# Patient Record
Sex: FEMALE | Race: OTHER | Employment: STUDENT | URBAN - METROPOLITAN AREA
[De-identification: names, ages, dates, MRNs, and addresses within clinical notes are randomized per-mention and may not be internally consistent; named-entity substitution may affect disease eponyms.]

---
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
## 2020-08-14 ENCOUNTER — OFFICE VISIT (OUTPATIENT)
Dept: CARDIOLOGY CLINIC | Age: 20
End: 2020-08-14
Payer: COMMERCIAL

## 2020-08-14 VITALS
SYSTOLIC BLOOD PRESSURE: 106 MMHG | BODY MASS INDEX: 32.39 KG/M2 | HEIGHT: 62 IN | WEIGHT: 176 LBS | OXYGEN SATURATION: 97 % | RESPIRATION RATE: 16 BRPM | HEART RATE: 101 BPM | DIASTOLIC BLOOD PRESSURE: 70 MMHG

## 2020-08-14 DIAGNOSIS — R00.0 TACHYCARDIA: ICD-10-CM

## 2020-08-14 DIAGNOSIS — R00.2 PALPITATIONS: ICD-10-CM

## 2020-08-14 DIAGNOSIS — R22.2 CHEST WALL MASS: ICD-10-CM

## 2020-08-14 DIAGNOSIS — R00.0 TACHYCARDIA: Primary | ICD-10-CM

## 2020-08-14 PROCEDURE — 99205 OFFICE O/P NEW HI 60 MIN: CPT | Performed by: INTERNAL MEDICINE

## 2020-08-14 PROCEDURE — 93000 ELECTROCARDIOGRAM COMPLETE: CPT | Performed by: INTERNAL MEDICINE

## 2020-08-14 NOTE — PROGRESS NOTES
HISTORY OF PRESENTING Dante Campbelloishady Phelan is a 21 y.o. female who presents with complaints of palpitations and tachycardia. She has experienced tachycardia at times of rest that have been noted on her apple watch. During those times she experiences pounding in her chest and palpitations. [facility] was seen at [facility] for similar complaints along with complaints of chest pain r/t a mass on her chest wall. Unfortunately, she was unable to have CT imaging done due to insurance/financial issues, but would like to re-attempt. EKG today shows sinus rhythm. She has had unremarkable CMP and CBC. She has family hx of hypertension in her parents, no additional family hx of cardiac issues that she is aware of. She denies symptoms upon standing or with exertion. PAST MEDICAL HISTORY     History reviewed. No pertinent past medical history. PAST SURGICAL HISTORY     History reviewed. No pertinent surgical history. ALLERGIES     No Known Allergies       FAMILY HISTORY     Family History   Problem Relation Age of Onset    Hypertension Mother     High Cholesterol Mother     Hypertension Father     High Cholesterol Father     negative for cardiac disease       SOCIAL HISTORY     Social History     Socioeconomic History    Marital status: SINGLE     Spouse name: Not on file    Number of children: Not on file    Years of education: Not on file    Highest education level: Not on file   Tobacco Use    Smoking status: Never Smoker    Smokeless tobacco: Never Used   Substance and Sexual Activity    Alcohol use: Never     Frequency: Never         MEDICATIONS     No current outpatient medications on file. No current facility-administered medications for this visit. I have reviewed the nurses notes, vitals, problem list, allergy list, medical history, family, social history and medications. REVIEW OF SYMPTOMS      General: Pt denies excessive weight gain or loss.  Pt is able to conduct ADL's  HEENT: Denies blurred vision, headaches, hearing loss, epistaxis and difficulty swallowing. Respiratory: Denies cough, congestion, shortness of breath, APONTE, wheezing or stridor. Cardiovascular: +palpitations, Denies precordial pain,  edema or PND  Gastrointestinal: Denies poor appetite, indigestion, abdominal pain or blood in stool  Genitourinary: Denies hematuria, dysuria, increased urinary frequency  Musculoskeletal: Denies joint pain or swelling from muscles or joints  Neurologic: Denies tremor, paresthesias, headache, or sensory motor disturbance  Psychiatric: Denies confusion, insomnia, depression  Integumentray: Denies rash, itching or ulcers. Hematologic: Denies easy bruising, bleeding       PHYSICAL EXAMINATION      Vitals: see vitals section  General: Well developed, in no acute distress. HEENT: No jaundice, oral mucosa moist, no oral ulcers  Neck: Supple, no stiffness, no lymphadenopathy, supple  Heart:  Normal S1/S2 negative S3 or S4. Regular, no murmur, gallop or rub, no jugular venous distention  Respiratory: Clear bilaterally x 4, no wheezing or rales  Abdomen:   Soft, non-tender, bowel sounds are active. Extremities:  No edema, normal cap refill, no cyanosis. Musculoskeletal: +mass, tender to mid/left chest wall  Neuro: A&Ox3, speech clear, gait stable, cooperative, no focal neurologic deficits  Skin: Skin color is normal. No rashes or lesions. Non diaphoretic, moist.  Vascular: 2+ pulses symmetric in all extremities       DIAGNOSTIC DATA      EKG: sinus tachycardia        LABORATORY DATA    No results found for: WBC, HGBPOC, HGB, HGBP, HCTPOC, HCT, PHCT, RBCH, PLT, MCV, HGBEXT, HCTEXT, PLTEXT, HGBEXT, HCTEXT, PLTEXT   No results found for: NA, K, CL, CO2, AGAP, GLU, BUN, CREA, BUCR, GFRAA, GFRNA, CA, TBIL, TBILI, AP, TP, ALB, GLOB, AGRAT, ALT        ASSESSMENT      1. Tachycardia  2. Palpitations  3.  Left chest wall mass       PLAN     Obtain thyroid panel, loop monitor for 30 days, echocardiogram and will re-order CT imaging to evaluate chest mass. ICD-10-CM ICD-9-CM    1. Tachycardia  R00.0 785.0 AMB POC EKG ROUTINE W/ 12 LEADS, INTER & REP      TSH 3RD GENERATION      T3 UPTAKE PROFILE      T4 (THYROXINE)      CTA CHEST W OR W WO CONT      ECHO ADULT COMPLETE   2. Palpitations  R00.2 785.1    3. Chest wall mass  R22.2 786.6      Orders Placed This Encounter    CTA CHEST W OR W WO CONT     Standing Status:   Future     Standing Expiration Date:   9/14/2021     Order Specific Question:   Is Patient Pregnant?      Answer:   No     Order Specific Question:   STAT Creatinine as indicated     Answer:   Yes     Order Specific Question:   Reason for Exam     Answer:   chest mass    TSH, 3RD GENERATION     Standing Status:   Future     Standing Expiration Date:   8/14/2021    T3 UPTAKE PROFILE     Standing Status:   Future     Standing Expiration Date:   8/14/2021    T4     Standing Status:   Future     Standing Expiration Date:   8/14/2021    AMB POC EKG ROUTINE W/ 12 LEADS, INTER & REP     Order Specific Question:   Reason for Exam:     Answer:   tachycardia          FOLLOW-UP     After testing        Ermelinda Blanco MD  Cardiac Electrophysiology / Cardiology    [address]  [address]  [phone] / [phone] Fax   [phone] / [phone] Fax

## 2020-08-14 NOTE — PROGRESS NOTES
ROOM # 2  CP, Heart rate above 100 at random times, HR fast @ night when lying down, Rare SOB with elevated HR  Visit Vitals  /70 (BP 1 Location: Left arm, BP Patient Position: Sitting)   Pulse (!) 101   Resp 16   Ht 5' 2\" (1.575 m)   Wt 176 lb (79.8 kg)   SpO2 97%   BMI 32.19 kg/m²

## 2020-08-15 LAB
FT4I SERPL CALC-MCNC: 3.3 (ref 1.2–4.9)
T3RU NFR SERPL: 32 % (ref 24–39)
T4 SERPL-MCNC: 10.3 UG/DL (ref 4.5–12)
TSH SERPL DL<=0.005 MIU/L-ACNC: <0.005 UIU/ML (ref 0.45–4.5)

## 2020-08-18 ENCOUNTER — TELEPHONE (OUTPATIENT)
Dept: CARDIOLOGY CLINIC | Age: 20
End: 2020-08-18

## 2020-08-18 NOTE — TELEPHONE ENCOUNTER
----- Message from Elissa Wallis NP sent at 8/17/2020 11:20 AM EDT -----  TSH abnormal, please have her see PCP.

## 2020-08-18 NOTE — TELEPHONE ENCOUNTER
Called patient, ID verified using two patient identifiers. Informed patient that per DANELLE Mora NP her TSH lab results are abnormal and that DANELLE Mora NP is advising that patient follow up with her PCP. Lab results faxed to [facility] on [address] per patient request. Patient will contact [facility] to schedule an appointment.

## 2020-09-02 ENCOUNTER — TELEPHONE (OUTPATIENT)
Dept: CARDIOLOGY CLINIC | Age: 20
End: 2020-09-02

## 2020-09-02 DIAGNOSIS — R00.2 PALPITATIONS: ICD-10-CM

## 2020-09-02 DIAGNOSIS — R00.0 TACHYCARDIA: Primary | ICD-10-CM

## 2020-09-02 DIAGNOSIS — R22.2 MASS OF LEFT CHEST WALL: ICD-10-CM

## 2020-09-02 NOTE — TELEPHONE ENCOUNTER
[facility] calling stating that dx: 1. tachycardia 2. palpitations 3. left chest wall mass - all need to be included in order for patient to appropriately receive ordered CT scan. [facility] ask that you please re-send over order with all 3 assessments included by this Friday, 09/04, for that is when patient is scheduled for CT scan at their office.        Fax # [phone]    [facility] # [phone]

## 2020-09-03 NOTE — TELEPHONE ENCOUNTER
[facility] is calling again to check on the status of an authorization for a CT scan scheduled for tomorrow 9/4.     Phone; [phone]  Fax: [phone]

## 2020-09-03 NOTE — TELEPHONE ENCOUNTER
Spoke to [facility] and they do not do Prior Auth for testing. Expect office to do PA. Checked with Maki and she does not do PA outside of [address]. [facility] explained to patient without PA CTA cannot be done at [address] do you have any ideas? Reviewed with Uriah Gilbert and Cabrera Browning and they did not know what steps to take to help.

## 2020-09-04 ENCOUNTER — ANCILLARY PROCEDURE (OUTPATIENT)
Dept: CARDIOLOGY CLINIC | Age: 20
End: 2020-09-04
Payer: COMMERCIAL

## 2020-09-04 ENCOUNTER — TELEPHONE (OUTPATIENT)
Dept: PEDIATRIC ENDOCRINOLOGY | Age: 20
End: 2020-09-04

## 2020-09-04 VITALS
DIASTOLIC BLOOD PRESSURE: 70 MMHG | BODY MASS INDEX: 32.39 KG/M2 | WEIGHT: 176 LBS | HEIGHT: 62 IN | SYSTOLIC BLOOD PRESSURE: 106 MMHG

## 2020-09-04 DIAGNOSIS — R07.9 CHEST PAIN, UNSPECIFIED TYPE: ICD-10-CM

## 2020-09-04 DIAGNOSIS — R00.2 PALPITATIONS: ICD-10-CM

## 2020-09-04 LAB
ECHO AO ASC DIAM: 2.49 CM
ECHO AO ROOT DIAM: 2.36 CM
ECHO AV AREA PEAK VELOCITY: 1.4 CM2
ECHO AV AREA VTI: 1.46 CM2
ECHO AV AREA/BSA PEAK VELOCITY: 0.8 CM2/M2
ECHO AV AREA/BSA VTI: 0.8 CM2/M2
ECHO AV MEAN GRADIENT: 13.88 MMHG
ECHO AV PEAK GRADIENT: 26.19 MMHG
ECHO AV PEAK VELOCITY: 255.87 CM/S
ECHO AV VTI: 50.19 CM
ECHO LA AREA 4C: 15 CM2
ECHO LA MAJOR AXIS: 3.93 CM
ECHO LA MINOR AXIS: 2.17 CM
ECHO LA VOL 2C: 49.95 ML (ref 22–52)
ECHO LA VOL 4C: 39.01 ML (ref 22–52)
ECHO LA VOL BP: 48.22 ML (ref 22–52)
ECHO LA VOL/BSA BIPLANE: 26.63 ML/M2 (ref 16–28)
ECHO LA VOLUME INDEX A2C: 27.59 ML/M2 (ref 16–28)
ECHO LA VOLUME INDEX A4C: 21.55 ML/M2 (ref 16–28)
ECHO LV E' LATERAL VELOCITY: 18.07 CM/S
ECHO LV E' SEPTAL VELOCITY: 15.99 CM/S
ECHO LV EDV A2C: 118.72 ML
ECHO LV EDV A4C: 114.2 ML
ECHO LV EDV BP: 118.28 ML (ref 56–104)
ECHO LV EDV INDEX A4C: 63.1 ML/M2
ECHO LV EDV INDEX BP: 65.3 ML/M2
ECHO LV EDV NDEX A2C: 65.6 ML/M2
ECHO LV EJECTION FRACTION A2C: 63 PERCENT
ECHO LV EJECTION FRACTION A4C: 61 PERCENT
ECHO LV EJECTION FRACTION BIPLANE: 62.2 PERCENT (ref 55–100)
ECHO LV ESV A2C: 44.28 ML
ECHO LV ESV A4C: 44.63 ML
ECHO LV ESV BP: 44.77 ML (ref 19–49)
ECHO LV ESV INDEX A2C: 24.5 ML/M2
ECHO LV ESV INDEX A4C: 24.7 ML/M2
ECHO LV ESV INDEX BP: 24.7 ML/M2
ECHO LV INTERNAL DIMENSION DIASTOLIC: 4.47 CM (ref 3.9–5.3)
ECHO LV INTERNAL DIMENSION SYSTOLIC: 2.86 CM
ECHO LV IVSD: 0.91 CM (ref 0.6–0.9)
ECHO LV MASS 2D: 119.8 G (ref 67–162)
ECHO LV MASS INDEX 2D: 66.2 G/M2 (ref 43–95)
ECHO LV POSTERIOR WALL DIASTOLIC: 0.77 CM (ref 0.6–0.9)
ECHO LVOT DIAM: 2.07 CM
ECHO LVOT PEAK GRADIENT: 4.56 MMHG
ECHO LVOT PEAK VELOCITY: 106.72 CM/S
ECHO LVOT SV: 73.4 ML
ECHO LVOT VTI: 21.92 CM
ECHO MV A VELOCITY: 68.39 CM/S
ECHO MV AREA PHT: 5.5 CM2
ECHO MV E DECELERATION TIME (DT): 0.14 S
ECHO MV E VELOCITY: 118.63 CM/S
ECHO MV E/A RATIO: 1.73
ECHO MV E/E' LATERAL: 6.57
ECHO MV E/E' RATIO (AVERAGED): 6.99
ECHO MV E/E' SEPTAL: 7.42
ECHO MV PRESSURE HALF TIME (PHT): 0.04 S
ECHO RA AREA 4C: 10.1 CM2
ECHO RA MAJOR AXIS: 2.99 CM
ECHO RV INTERNAL DIMENSION: 2.71 CM
ECHO RV TAPSE: 1.84 CM (ref 1.5–2)
ECHO TV REGURGITANT MAX VELOCITY: 253.2 CM/S
ECHO TV REGURGITANT PEAK GRADIENT: 25.64 MMHG
LA VOL DISK BP: 44.63 ML (ref 22–52)

## 2020-09-04 PROCEDURE — 93306 TTE W/DOPPLER COMPLETE: CPT | Performed by: INTERNAL MEDICINE

## 2020-09-04 NOTE — TELEPHONE ENCOUNTER
----- Message from Cidra Cart sent at 9/4/2020 12:38 PM EDT -----  Regarding: Dr Zavala Portal: [phone]  Patient is calling to fax over the  2 labwork order to [address] [place] [address]. Patient is trying to go right now to get it done.  Please advise    Fax: [phone] [facility]  Phone: [phone]

## 2020-09-08 ENCOUNTER — TELEPHONE (OUTPATIENT)
Dept: PEDIATRIC ENDOCRINOLOGY | Age: 20
End: 2020-09-08

## 2020-09-08 ENCOUNTER — DOCUMENTATION ONLY (OUTPATIENT)
Dept: PEDIATRIC ENDOCRINOLOGY | Age: 20
End: 2020-09-08

## 2020-09-08 RX ORDER — METHIMAZOLE 10 MG/1
15 TABLET ORAL DAILY
Qty: 45 TAB | Refills: 4 | Status: SHIPPED | OUTPATIENT
Start: 2020-09-08 | End: 2021-02-17

## 2020-09-08 NOTE — PROGRESS NOTES
Talked to the patient over the phone. Reviewed the thyroid function test results and it indicates she has hyperthyroidism. Started methimazole 10 mg tablet, 1.5 tablets once a day. Side effects of the medications reviewed. Methimazole side effects:    Reviewed stopping the Methimazole immediately and informing us by calling [phone] if the child develops   1. pruritic rash,   2. jaundice,   3. acolic (light color stools) stools or dark urine,   4. joint pain, abdominal pain   5. Fever or pharyngitis (sore throat). Please schedule the patient for follow-up visit:  Type of visit; in person  Follow-up in 4 weeks to be scheduled on October 5 at 1:40 PM.  Patient has been informed.

## 2020-09-08 NOTE — TELEPHONE ENCOUNTER
----- Message from Dang Solis sent at 9/8/2020  9:09 AM EDT -----  Regarding: Dr Gianna Daniel: [phone]  Patient is returning a phone call to the doctor.

## 2020-09-15 ENCOUNTER — OFFICE VISIT (OUTPATIENT)
Dept: CARDIOLOGY CLINIC | Age: 20
End: 2020-09-15
Payer: COMMERCIAL

## 2020-09-15 VITALS
WEIGHT: 179 LBS | HEIGHT: 62 IN | HEART RATE: 108 BPM | SYSTOLIC BLOOD PRESSURE: 110 MMHG | BODY MASS INDEX: 32.94 KG/M2 | OXYGEN SATURATION: 98 % | DIASTOLIC BLOOD PRESSURE: 72 MMHG

## 2020-09-15 DIAGNOSIS — R00.2 PALPITATIONS: Primary | ICD-10-CM

## 2020-09-15 DIAGNOSIS — R00.0 TACHYCARDIA: ICD-10-CM

## 2020-09-15 DIAGNOSIS — E03.9 HYPOTHYROIDISM, UNSPECIFIED TYPE: ICD-10-CM

## 2020-09-15 DIAGNOSIS — R22.2 CHEST WALL MASS: ICD-10-CM

## 2020-09-15 PROCEDURE — 99215 OFFICE O/P EST HI 40 MIN: CPT | Performed by: NURSE PRACTITIONER

## 2020-09-15 NOTE — PROGRESS NOTES
HISTORY OF PRESENTING Dante Campbelloishady Phelan is a 21 y.o. female with complaints of palpitations and tachycardia. She has experienced tachycardia at times of rest that have been noted on her apple watch. During those times she experiences pounding in her chest and palpitations. She was seen at [facility] for similar complaints along with complaints of chest pain r/t a mass on her chest wall. Unfortunately, she was unable to have CT imaging done due to insurance/financial issues, but would like to re-attempt. EKG today shows sinus rhythm. She has had unremarkable CMP and CBC. She has family hx of hypertension in her parents, no additional family hx of cardiac issues that she is aware of. She denies symptoms upon standing or with exertion. She has not been able to complete CTA ordered last visit. Echocardiogram showed normal functioning and 30 day monitor thus far shows sinus rhythm/sinus tachycardia. She had abnormal thyroid which she is in the process of being treated for with Tapazole. PAST MEDICAL HISTORY     History reviewed. No pertinent past medical history. PAST SURGICAL HISTORY     History reviewed. No pertinent surgical history.        ALLERGIES     No Known Allergies       FAMILY HISTORY     Family History   Problem Relation Age of Onset    Hypertension Mother     High Cholesterol Mother     Hypertension Father     High Cholesterol Father     negative for cardiac disease       SOCIAL HISTORY     Social History     Socioeconomic History    Marital status: SINGLE     Spouse name: Not on file    Number of children: Not on file    Years of education: Not on file    Highest education level: Not on file   Tobacco Use    Smoking status: Never Smoker    Smokeless tobacco: Never Used   Substance and Sexual Activity    Alcohol use: Never     Frequency: Never    Drug use: Never         MEDICATIONS     Current Outpatient Medications   Medication Sig    methIMAzole (TAPAZOLE) 10 mg tablet Take 1.5 Tabs by mouth daily. Indications: overactive thyroid gland     No current facility-administered medications for this visit. I have reviewed the nurses notes, vitals, problem list, allergy list, medical history, family, social history and medications. REVIEW OF SYMPTOMS      General: Pt denies excessive weight gain or loss. Pt is able to conduct ADL's  HEENT: Denies blurred vision, headaches, hearing loss, epistaxis and difficulty swallowing. Respiratory: Denies cough, congestion, shortness of breath, APONTE, wheezing or stridor. Cardiovascular: Denies precordial pain, palpitations, edema or PND  Gastrointestinal: Denies poor appetite, indigestion, abdominal pain or blood in stool  Genitourinary: Denies hematuria, dysuria, increased urinary frequency  Musculoskeletal: Denies joint pain or swelling from muscles or joints  Neurologic: Denies tremor, paresthesias, headache, or sensory motor disturbance  Psychiatric: Denies confusion, insomnia, depression  Integumentray: Denies rash, itching or ulcers. Hematologic: Denies easy bruising, bleeding       PHYSICAL EXAMINATION      Vitals: see vitals section  General: Well developed, in no acute distress. HEENT: No jaundice, oral mucosa moist, no oral ulcers  Neck: Supple, no stiffness, no lymphadenopathy, supple  Heart:  Normal S1/S2 negative S3 or S4. Regular, no murmur, gallop or rub, no jugular venous distention  Respiratory: Clear bilaterally x 4, no wheezing or rales  Abdomen:   Soft, non-tender, bowel sounds are active. Extremities:  No edema, normal cap refill, no cyanosis. Musculoskeletal: No clubbing, no deformities  Neuro: A&Ox3, speech clear, gait stable, cooperative, no focal neurologic deficits  Skin: Skin color is normal. No rashes or lesions.  Non diaphoretic, moist.  Vascular: 2+ pulses symmetric in all extremities       DIAGNOSTIC DATA      EKG:        LABORATORY DATA      Lab Results   Component Value Date/Time    WBC 6.1 09/04/2020 01:07 PM    HGB 13.0 09/04/2020 01:07 PM    HCT 40.2 09/04/2020 01:07 PM    PLATELET 181 60/72/7136 01:07 PM    MCV 85 09/04/2020 01:07 PM      Lab Results   Component Value Date/Time    Sodium 141 09/04/2020 01:07 PM    Potassium 4.5 09/04/2020 01:07 PM    Chloride 104 09/04/2020 01:07 PM    CO2 23 09/04/2020 01:07 PM    Glucose 87 09/04/2020 01:07 PM    BUN 8 09/04/2020 01:07 PM    Creatinine 0.65 09/04/2020 01:07 PM    BUN/Creatinine ratio 12 09/04/2020 01:07 PM    GFR est  09/04/2020 01:07 PM    GFR est non- 09/04/2020 01:07 PM    Calcium 10.0 09/04/2020 01:07 PM    Bilirubin, total 0.7 09/04/2020 01:07 PM    Alk. phosphatase 84 09/04/2020 01:07 PM    Protein, total 7.4 09/04/2020 01:07 PM    Albumin 4.5 09/04/2020 01:07 PM    A-G Ratio 1.6 09/04/2020 01:07 PM    ALT (SGPT) 51 (H) 09/04/2020 01:07 PM           ASSESSMENT      1. Tachycardia  2. Palpitations  3. Left chest wall mass       PLAN     Continue treatment for thyroid therapy. Complete 30 day monitor and will evaluate. Suspect that her symptoms are r/t thyroid abnormality and will see PRN for continued symptoms after thyroid has been adjusted. ICD-10-CM ICD-9-CM    1. Palpitations  R00.2 785.1    2. Tachycardia  R00.0 785.0    3. Chest wall mass  R22.2 786.6    4. Hypothyroidism, unspecified type  E03.9 244.9      No orders of the defined types were placed in this encounter. FOLLOW-UP   prn    Thank you, None for allowing me to participate in the care of this extraordinarily pleasant female. Please do not hesitate to contact me for further questions/concerns.        Melody Sushma, NP    [address]  [address] [facility], [facility], [address] [place], [place] [address], [place]  [phone] / [phone] Fax   [phone] / [phone] Fax

## 2020-09-15 NOTE — PROGRESS NOTES
Room 5    Visit Vitals  /72 (BP 1 Location: Left arm, BP Patient Position: Sitting)   Pulse (!) 108   Ht 5' 2\" (1.575 m)   Wt 179 lb (81.2 kg)   SpO2 98%   BMI 32.74 kg/m²         Still feels flutters  Feels like my heart beat drops randomly  But happening less      Chest pain: no  Shortness of breath: no  Edema: no  Palpitations, Skipped beats, Rapid heartbeat: no  Dizziness: no    New diagnosis/Surgeries: no    ER/Hospitalizations: no    Refills: NO

## 2020-09-17 ENCOUNTER — DOCUMENTATION ONLY (OUTPATIENT)
Dept: CARDIOLOGY CLINIC | Age: 20
End: 2020-09-17

## 2020-09-17 NOTE — PROGRESS NOTES
Authorization started with [employer] ([phone]) for CTA of chest with or without contrast (CPT code 35039), to be done at [address] [place] [address] [phone]. Case # [id number]. Clinicals faxed to [phone].

## 2020-10-05 ENCOUNTER — OFFICE VISIT (OUTPATIENT)
Dept: PEDIATRIC ENDOCRINOLOGY | Age: 20
End: 2020-10-05

## 2020-10-05 VITALS
RESPIRATION RATE: 20 BRPM | HEART RATE: 110 BPM | HEIGHT: 64 IN | DIASTOLIC BLOOD PRESSURE: 83 MMHG | BODY MASS INDEX: 30.73 KG/M2 | TEMPERATURE: 97.2 F | OXYGEN SATURATION: 97 % | WEIGHT: 180 LBS | SYSTOLIC BLOOD PRESSURE: 121 MMHG

## 2020-10-05 DIAGNOSIS — E05.90 HYPERTHYROIDISM: Primary | ICD-10-CM

## 2020-10-05 PROCEDURE — 99214 OFFICE O/P EST MOD 30 MIN: CPT | Performed by: PEDIATRICS

## 2020-10-05 NOTE — LETTER
10/5/2020 1:27 PM 
 
Patient:  Enriqueta Boswell YOB: 2000 Date of Visit: 10/5/2020 Dear No Recipients: Thank you for referring Ms. Braden Acosta to me for evaluation/treatment. Below are the relevant portions of my assessment and plan of care. If you have questions, please do not hesitate to call me. I look forward to following MsConsuelo Sarika Echols along with you. Sincerely, Olivia Mock MD

## 2020-10-05 NOTE — PROGRESS NOTES
Cc: 1. Primary hyperthyroidism          2. Goiter         3. Increased heart rate    HOPC:   1. Patient is 21year old with primary hyperthyroidism and is on methimazole 15 mg once a day. Compliance with medication is good. Patient takes the medication in the evening. Patient has good energy, has normal bowel movements. Side effects of medications including skin rash, joint pain, abdominal pain not present. 2. Goiter: no changes, pain in the neck or during swallowing not present  3. Increased heart rate and has less palpitations    ROS: no bone pain, muscle cramps,no abdominal pain, normal bowel movements Good energy, no weakness     Family history: diabetes: yes, thyroid dysfunction: yes. Social history: in [place] [facility], [employer] [facility] [employer]. Visit Vitals  /83 (BP 1 Location: Right arm, BP Patient Position: Sitting)   Pulse (!) 110   Temp 97.2 °F (36.2 °C) (Temporal)   Resp 20   Ht 5' 3.66\" (1.617 m)   Wt 180 lb (81.6 kg)   LMP 07/30/2020   SpO2 97%   BMI 31.23 kg/m²     Neck is supple, no lymphadenopathy, no thyromegaly, no nodules, has acanthosis, pulse: equal and normal Abdomen is nondistended, no pedal edemal     Labs from last visit reviewed:   Component      Latest Ref Rng & Units 9/4/2020 9/4/2020 9/4/2020 9/4/2020           1:07 PM  1:07 PM  1:07 PM  1:07 PM   Alk. phosphatase      39 - 117 IU/L 84      AST      0 - 40 IU/L 32      ALT      0 - 32 IU/L 51 (H)      TSH      0.450 - 4.500 uIU/mL       T4, Free      0.82 - 1.77 ng/dL    1.54   T3, total      71 - 180 ng/dL   217 (H)    Thyroid Stim Immunoglobulin      0.00 - 0.55 IU/L       Thyroid peroxidase Ab      0 - 34 IU/mL         Component      Latest Ref Rng & Units 9/4/2020 9/4/2020 9/4/2020           1:07 PM  1:04 PM  1:04 PM   Alk.  phosphatase      39 - 117 IU/L      AST      0 - 40 IU/L      ALT      0 - 32 IU/L      TSH      0.450 - 4.500 uIU/mL <0.005 (L)     T4, Free      0.82 - 1.77 ng/dL      T3, total      71 - 180 ng/dL      Thyroid Stim Immunoglobulin      0.00 - 0.55 IU/L   1.70 (H)   Thyroid peroxidase Ab      0 - 34 IU/mL  583 (H)        A/P:   1. Primary hyperthyroidism will check Total T3, free T4 and TSH   Continue methimazole at 15 mg, once a day. 2. Goiter: none  3. Increased heart rate and has no palpitations  4. Elevated liver enzyme and acanthosis and insulin resistance  Side effects of methimazole including skin rash, joint pain, liver dysfunction and rare side effects neutropenia reviewed. Patient need to get CBC with diff if they persistent fever of more than 101 *f for 2-3 days to rule out neutropenia. Methimazole side effects:    Reviewed stopping the Methimazole immediately and informing us by calling [phone] if the child develops   1. pruritic rash,   2. jaundice,   3. acolic (light color stools) stools or dark urine,   4. joint pain, abdominal pain   5. Fever or pharyngitis (sore throat). Follow up in 2 months.

## 2020-10-06 LAB
ALT SERPL-CCNC: 35 IU/L (ref 0–32)
AST SERPL-CCNC: 21 IU/L (ref 0–40)
T3 SERPL-MCNC: 127 NG/DL (ref 71–180)
T4 FREE SERPL-MCNC: 1.22 NG/DL (ref 0.82–1.77)
TSH SERPL DL<=0.005 MIU/L-ACNC: <0.005 UIU/ML (ref 0.45–4.5)

## 2020-12-10 ENCOUNTER — OFFICE VISIT (OUTPATIENT)
Dept: PEDIATRIC ENDOCRINOLOGY | Age: 20
End: 2020-12-10
Payer: COMMERCIAL

## 2020-12-10 VITALS
HEART RATE: 95 BPM | WEIGHT: 181.2 LBS | BODY MASS INDEX: 32.11 KG/M2 | OXYGEN SATURATION: 98 % | HEIGHT: 63 IN | TEMPERATURE: 97.6 F | DIASTOLIC BLOOD PRESSURE: 74 MMHG | SYSTOLIC BLOOD PRESSURE: 107 MMHG | RESPIRATION RATE: 20 BRPM

## 2020-12-10 DIAGNOSIS — E05.90 HYPERTHYROIDISM: Primary | ICD-10-CM

## 2020-12-10 PROCEDURE — 99214 OFFICE O/P EST MOD 30 MIN: CPT | Performed by: PEDIATRICS

## 2020-12-10 NOTE — PROGRESS NOTES
Cc: 1. Primary hyperthyroidism          2. Goiter         3. Increased heart rate    HOPC:   1. Patient is 18.5 year old with primary hyperthyroidism and is on methimazole 15 mg once a day. Compliance with medication is good. Patient takes the medication in the morning. Patient has good energy, has normal bowel movements. Side effects of medications including skin rash, joint pain, abdominal pain not present. 2. Goiter: no changes, pain in the neck or during swallowing not present  3. Increased heart rate: resolved    ROS: no bone pain, muscle cramps,no abdominal pain, normal bowel movements Good energy, no weakness     Family history: diabetes: yes, thyroid dysfunction: no. Social history: doing well in college and is in 2 nd year. Visit Vitals  /74 (BP 1 Location: Right arm, BP Patient Position: Sitting)   Pulse 95   Temp 97.6 °F (36.4 °C) (Oral)   Resp 20   Ht 5' 3.39\" (1.61 m)   Wt 181 lb 3.2 oz (82.2 kg)   SpO2 98%   BMI 31.71 kg/m²       Neck is supple, no lymphadenopathy, no thyromegaly,  thyroid gland is smoothe, no nodules, has  dark circles around the neck, pulse equal and normal   Abdomen is nondistended, DTR: normal     Labs from last visit reviewed:   Lab Results   Component Value Date/Time    TSH <0.005 (L) 10/05/2020 01:37 PM    T3 Uptake 32 08/14/2020 04:20 PM    T4, Free 1.22 10/05/2020 01:37 PM    T4, Total 10.3 08/14/2020 04:20 PM     Growth chart: reviewed      A/P:   1. Primary hyperthyroidism will check Total T3, free T4 and TSH   Continue methimazole at 15 mg, once a day. 2.  Increased heart rate; resolved  3. Insulin resistance and acanthosis  Side effects of methimazole including skin rash, joint pain, liver dysfunction and rare side effects neutropenia reviewed. Patient need to get CBC with diff if they persistent fever of more than 101 *f for 2-3 days to rule out neutropenia.     Methimazole side effects:    Reviewed stopping the Methimazole immediately and informing us by calling [phone] if the child develops   1. pruritic rash,   2. jaundice,   3. acolic (light color stools) stools or dark urine,   4. joint pain, abdominal pain   5. Fever or pharyngitis (sore throat). Follow up in 4 months.

## 2020-12-11 DIAGNOSIS — E05.90 HYPERTHYROIDISM: ICD-10-CM

## 2020-12-11 LAB
T3 SERPL-MCNC: 100 NG/DL (ref 71–180)
T4 FREE SERPL-MCNC: 0.85 NG/DL (ref 0.82–1.77)
TSH SERPL DL<=0.005 MIU/L-ACNC: 1.56 UIU/ML (ref 0.45–4.5)

## 2020-12-11 NOTE — PROGRESS NOTES
Thyroid test came back normal.  Continue the current dose of medications and follow-up as scheduled.

## 2021-02-17 RX ORDER — METHIMAZOLE 10 MG/1
TABLET ORAL
Qty: 45 TAB | Refills: 4 | Status: SHIPPED | OUTPATIENT
Start: 2021-02-17 | End: 2021-04-15 | Stop reason: SDUPTHER

## 2021-04-15 ENCOUNTER — OFFICE VISIT (OUTPATIENT)
Dept: PEDIATRIC ENDOCRINOLOGY | Age: 21
End: 2021-04-15
Payer: COMMERCIAL

## 2021-04-15 VITALS
HEIGHT: 63 IN | SYSTOLIC BLOOD PRESSURE: 126 MMHG | BODY MASS INDEX: 33.17 KG/M2 | OXYGEN SATURATION: 99 % | HEART RATE: 81 BPM | RESPIRATION RATE: 16 BRPM | WEIGHT: 187.2 LBS | TEMPERATURE: 97.7 F | DIASTOLIC BLOOD PRESSURE: 84 MMHG

## 2021-04-15 DIAGNOSIS — N91.3 PRIMARY OLIGOMENORRHEA: ICD-10-CM

## 2021-04-15 DIAGNOSIS — E05.90 HYPERTHYROIDISM: Primary | ICD-10-CM

## 2021-04-15 PROCEDURE — 99214 OFFICE O/P EST MOD 30 MIN: CPT | Performed by: PEDIATRICS

## 2021-04-15 RX ORDER — METHIMAZOLE 10 MG/1
TABLET ORAL
Qty: 45 TAB | Refills: 4 | Status: SHIPPED | OUTPATIENT
Start: 2021-04-15 | End: 2021-12-17 | Stop reason: SDUPTHER

## 2021-04-15 NOTE — PROGRESS NOTES
Chief Complaint   Patient presents with    Follow-up     Thyroid      No recent labs  No questions or concerns stated to this nurse

## 2021-04-15 NOTE — PROGRESS NOTES
Cc: 1. Primary hyperthyroidism          2. Goiter         3. Increased weight gain    HOPC:   1. Patient is 21year old with primary hyperthyroidism and is not on methimazole. She ran out of medication 2 months ago and not been on the medicine since then. Patient has good energy, has normal appetite, denied palpitation, jitteriness, no change in her weight, normal bowel movements. 2. Goiter: no changes, pain in the neck or during swallowing not present  3. Increased weight gain; not able to do well with physical activity, she is busy with her college application and studies. She had elevated testosterone before and her every menstrual cycle is between 35 to 40 days. ROS: no bone pain, muscle cramps,no abdominal pain, normal bowel movements Good energy, no weakness. Social history: doing well at college. Visit Vitals  /84 (BP 1 Location: Left upper arm)   Pulse 81   Temp 97.7 °F (36.5 °C) (Temporal)   Resp 16   Ht 5' 2.95\" (1.599 m)   Wt 187 lb 3.2 oz (84.9 kg)   SpO2 99%   BMI 33.21 kg/m²   Neck is supple, no lymphadenopathy, mild thyromegaly, thyroid gland is smooth, no nodules, has dark circles around the neck, pulse equal and normal rhythm, Abdomen is nondistended, DTR: normal     Labs from last visit reviewed:   Lab Results   Component Value Date/Time    TSH 1.560 12/10/2020 11:18 AM     Growth chart: reviewed    A/P:   1. Primary hyperthyroidism will check Total T3, free T4 and TSH    Importance of taking medication was reviewed and association of taking medication and keeping the thyroid levels in euthyroid status and effect on metabolism was discussed. She will hold off on her taking medication until thyroid function test are back  2. Goiter  3. Increased weight gain: Insulin resistance and had elevated testosterone before and we will check hormone levels today to evaluate ovarian function.   Side effects of methimazole including skin rash, joint pain, liver dysfunction and rare side effects neutropenia reviewed. Patient need to get CBC with diff if they persistent fever of more than 101 *f for 2-3 days to rule out neutropenia. Methimazole side effects:    Reviewed stopping the Methimazole immediately and informing us by calling [phone] if the child develops   1. pruritic rash,   2. jaundice,   3. acolic (light color stools) stools or dark urine,   4. joint pain, abdominal pain   5. Fever or pharyngitis (sore throat). Follow up in 3 months.

## 2021-04-16 LAB
COMMENT, TESC2: ABNORMAL
DHEA-S SERPL-MCNC: 557 UG/DL (ref 110–431.7)
FSH SERPL-ACNC: 4.9 MIU/ML
LH SERPL-ACNC: 9.4 MIU/ML
PROLACTIN SERPL-MCNC: 9.5 NG/ML
SHBG SERPL-SCNC: 26.2 NMOL/L (ref 24.6–122)
T3 SERPL-MCNC: 110 NG/DL (ref 71–180)
T4 FREE SERPL-MCNC: 1 NG/DL (ref 0.8–1.5)
TESTOST FREE MFR SERPL: 18.5 NG/DL (ref 0–9.5)
TESTOST SERPL-MCNC: 81 NG/DL (ref 8–48)
TESTOSTERONE.FREE+WB MFR SERPL: 22.8 % (ref 3–18)
TSH SERPL DL<=0.05 MIU/L-ACNC: 0.91 UIU/ML (ref 0.36–3.74)

## 2021-04-22 LAB — 17OHP SERPL-MCNC: 79 NG/DL

## 2021-04-23 ENCOUNTER — DOCUMENTATION ONLY (OUTPATIENT)
Dept: PEDIATRIC ENDOCRINOLOGY | Age: 21
End: 2021-04-23

## 2021-04-23 DIAGNOSIS — N91.3 PRIMARY OLIGOMENORRHEA: Primary | ICD-10-CM

## 2021-05-13 ENCOUNTER — PATIENT MESSAGE (OUTPATIENT)
Dept: PEDIATRIC ENDOCRINOLOGY | Age: 21
End: 2021-05-13

## 2021-05-13 NOTE — TELEPHONE ENCOUNTER
From: Kate Kothari  To: Tank Alicia MD  Sent: 5/13/2021 11:17 AM EDT  Subject: Visit Follow-Up Question    Good morning, I talked with Dr. Gato Deutsch a couple weeks ago about going for a urine test and getting prescription for birth control/hormone medicine but I never received the lab slip or prescription.  I was wondering if the slip was supposed to be mailed or uploaded to the portal?

## 2021-05-23 LAB — HCG UR QL: NEGATIVE

## 2021-05-26 ENCOUNTER — DOCUMENTATION ONLY (OUTPATIENT)
Dept: PEDIATRIC ENDOCRINOLOGY | Age: 21
End: 2021-05-26

## 2021-05-26 NOTE — PROGRESS NOTES
Results was reviewed with the patient. Total testosterone is elevated for the age. DHEA-S is also elevated. Reviewed the ultrasound source and ovarian source for DHEA-S. She had pelvic ultrasound before and had normal ovaries and adrenal gland. She is on her menstrual cycles at present. After her present menstrual cycle she will start low-dose OCP. Univa Side effects of OCP was reviewed. Contraindications to OCP: none and was reviewed  Previous thromboembolic event or stroke  History of estrogen-dependent tumor  Active liver disease  Pregnancy  Hypertriglyceridemia    We will repeat the testosterone, DHEA-S in 3 months. Follow-up pelvic ultrasound will be done. Depending on the repeat DHEA-S appropriate imaging will be arranged and patient expressed understanding. Continue the diet and exercise as discussed.

## 2021-07-16 ENCOUNTER — OFFICE VISIT (OUTPATIENT)
Dept: PEDIATRIC ENDOCRINOLOGY | Age: 21
End: 2021-07-16
Payer: COMMERCIAL

## 2021-07-16 VITALS
BODY MASS INDEX: 33.02 KG/M2 | WEIGHT: 186.38 LBS | OXYGEN SATURATION: 98 % | TEMPERATURE: 97.8 F | DIASTOLIC BLOOD PRESSURE: 90 MMHG | HEIGHT: 63 IN | HEART RATE: 127 BPM | RESPIRATION RATE: 18 BRPM | SYSTOLIC BLOOD PRESSURE: 120 MMHG

## 2021-07-16 DIAGNOSIS — R68.89 ABNORMAL ENDOCRINE LABORATORY TEST FINDING: ICD-10-CM

## 2021-07-16 DIAGNOSIS — E05.90 HYPERTHYROIDISM: Primary | ICD-10-CM

## 2021-07-16 PROCEDURE — 99214 OFFICE O/P EST MOD 30 MIN: CPT | Performed by: PEDIATRICS

## 2021-07-16 NOTE — PATIENT INSTRUCTIONS
Methimazole side effects:    Reviewed stopping the Methimazole immediately and informing us by calling [phone] if the child develops   1. pruritic rash,   2. jaundice,   3. acolic (light color stools) stools or dark urine,   4. joint pain, abdominal pain   5. Fever or pharyngitis (sore throat).

## 2021-07-16 NOTE — PROGRESS NOTES
Cc: 1. Primary hyperthyroidism          2. Increased heart rate         3. Irregular menstrual cycle     HOPC:   1. Patient is 24year old with primary hyperthyroidism and is on methimazole 15 mg once a day. Compliance with medication is good. Patient takes the medication in the morning. Patient has good energy, has normal bowel movements. Side effects of medications including skin rash, joint pain, abdominal pain not present. 2. Goiter: none  3. Irregular menstrual cycle: on OCP and menstrual cycle are regular on OCP. Increased heart rate, and she is feeling anxious. ROS: no bone pain, muscle cramps,no abdominal pain, normal bowel movements Good energy, no weakness. Family history: diabetes: no, thyroid dysfunction: no. Social history: doing well at school. Visit Vitals  BP (!) 120/90 (BP 1 Location: Left arm, BP Patient Position: Sitting)   Pulse (!) 127   Temp 97.8 °F (36.6 °C) (Temporal)   Resp 18   Ht 5' 3.03\" (1.601 m)   Wt 186 lb 6 oz (84.5 kg)   SpO2 98%   BMI 32.98 kg/m²   Neck is supple, no lymphadenopathy, no thyromegaly, thyroid gland is normal, no nodules, no dark circles around the neck, pulse equal and normal rhythm Abdomen is nondistended, DTR: normal     Labs from last visit reviewed:   Lab Results   Component Value Date/Time    TSH 0.91 04/15/2021 09:51 AM     Growth chart: reviewed    A/P:   1. Primary hyperthyroidism will check Total T3, free T4 and TSH   Continue methimazole at 15 mg, once a day. Importance of taking medication was reviewed and association of taking medication and keeping the thyroid levels in euthyroid status and effect on metabolism was discussed. 2. Increased heart rate: anxious   3. Irregular menstrual cycles: menstrual cycles are regular on OCP. Side effects of methimazole including skin rash, joint pain, liver dysfunction and rare side effects neutropenia reviewed.  Patient need to get CBC with diff if they persistent fever of more than 101 *f for 2-3 days to rule out neutropenia. Methimazole side effects:    Reviewed stopping the Methimazole immediately and informing us by calling [phone] if the child develops   1. pruritic rash,   2. jaundice,   3. acolic (light color stools) stools or dark urine,   4. joint pain, abdominal pain   5. Fever or pharyngitis (sore throat). Follow up in 4 months.

## 2021-11-19 ENCOUNTER — OFFICE VISIT (OUTPATIENT)
Dept: PEDIATRIC ENDOCRINOLOGY | Age: 21
End: 2021-11-19
Payer: COMMERCIAL

## 2021-11-19 VITALS
RESPIRATION RATE: 18 BRPM | OXYGEN SATURATION: 98 % | DIASTOLIC BLOOD PRESSURE: 77 MMHG | HEIGHT: 63 IN | SYSTOLIC BLOOD PRESSURE: 114 MMHG | HEART RATE: 84 BPM | BODY MASS INDEX: 32.32 KG/M2 | WEIGHT: 182.4 LBS

## 2021-11-19 DIAGNOSIS — E05.90 HYPERTHYROIDISM: Primary | ICD-10-CM

## 2021-11-19 DIAGNOSIS — E88.81 INSULIN RESISTANCE: ICD-10-CM

## 2021-11-19 PROCEDURE — 99214 OFFICE O/P EST MOD 30 MIN: CPT | Performed by: PEDIATRICS

## 2021-11-19 NOTE — PROGRESS NOTES
Identified pt with two pt identifiers(name and ). Reviewed record in preparation for visit and have obtained necessary documentation. Chief Complaint   Patient presents with    Thyroid Problem        Health Maintenance Due   Topic    Hepatitis C Screening     COVID-19 Vaccine (1)    HPV Age 9Y-34Y (1 - 2-dose series)    DTaP/Tdap/Td series (1 - Tdap)    Pap Smear     Flu Vaccine (1)        Visit Vitals  /77 (BP 1 Location: Left upper arm, BP Patient Position: Sitting)   Pulse 84   Resp 18   Ht 5' 2.99\" (1.6 m)   Wt 182 lb 6.4 oz (82.7 kg)   SpO2 98%   BMI 32.32 kg/m²     Pain Scale: 0 - No pain/10    Coordination of Care Questionnaire:  :   1. Have you been to the ER, urgent care clinic since your last visit? Hospitalized since your last visit? No    2. Have you seen or consulted any other health care providers outside of the [address] [place] [address] since your last visit? Include any pap smears or colon screening.  No

## 2021-11-19 NOTE — PROGRESS NOTES
Cc: 1. Primary hyperthyroidism          2. Increased heart rate         3. Irregular menstrual cycle      HOPC:   1. Patient is 24year old with primary hyperthyroidism and is on methimazole 15 mg once a day. Compliance with medication is good. Patient takes the medication in the afternoon. Patient has good energy, has normal bowel movements. Side effects of medications including skin rash, joint pain, abdominal pain not present. 2. Goiter: none  3. Irregular menstrual cycle: on OCP and menstrual cycle are regular on OCP. Increased heart rate, and she is feeling less anxious. ROS: no bone pain, muscle cramps,no abdominal pain, normal bowel movements Good energy, no weakness. Family history: diabetes: no, thyroid dysfunction: no. Social history: doing well at school. Visit Vitals  /77 (BP 1 Location: Left upper arm, BP Patient Position: Sitting)   Pulse 84   Resp 18   Ht 5' 2.99\" (1.6 m)   Wt 182 lb 6.4 oz (82.7 kg)   SpO2 98%   BMI 32.32 kg/m²     Neck is supple, no lymphadenopathy, no thyromegaly, has dark circles around the neck, pulse equal and normal rhythm, Abdomen is nondistended, DTR: normal     Labs from last visit reviewed:   Lab Results   Component Value Date/Time    TSH 1.10 07/16/2021 11:09 AM     A/P:   1. Primary hyperthyroidism will check Total T3, free T4 and TSH   Continue methimazole at 15 mg, once a day. Importance of taking medication was reviewed and association of taking medication and keeping the thyroid levels in euthyroid status and effect on metabolism was discussed. 2. obesity  3. Insulin resistance and reviewed and A1C was ordered   Side effects of methimazole including skin rash, joint pain, liver dysfunction and rare side effects neutropenia reviewed. Patient need to get CBC with diff if they persistent fever of more than 101 *f for 2-3 days to rule out neutropenia.     Methimazole side effects:    Reviewed stopping the Methimazole immediately and informing us by calling [phone] if the child develops   1. pruritic rash,   2. jaundice,   3. acolic (light color stools) stools or dark urine,   4. joint pain, abdominal pain   5. Fever or pharyngitis (sore throat). Follow up in 4 months.

## 2021-12-20 RX ORDER — METHIMAZOLE 10 MG/1
TABLET ORAL
Qty: 45 TABLET | Refills: 4 | Status: SHIPPED | OUTPATIENT
Start: 2021-12-20 | End: 2022-05-24

## 2022-03-14 ENCOUNTER — OFFICE VISIT (OUTPATIENT)
Dept: PEDIATRIC ENDOCRINOLOGY | Age: 22
End: 2022-03-14
Payer: COMMERCIAL

## 2022-03-14 VITALS
SYSTOLIC BLOOD PRESSURE: 118 MMHG | TEMPERATURE: 98.2 F | WEIGHT: 172.4 LBS | BODY MASS INDEX: 30.55 KG/M2 | DIASTOLIC BLOOD PRESSURE: 85 MMHG | HEIGHT: 63 IN | HEART RATE: 85 BPM

## 2022-03-14 DIAGNOSIS — E05.90 HYPERTHYROIDISM: Primary | ICD-10-CM

## 2022-03-14 LAB
ALBUMIN SERPL-MCNC: 3.7 G/DL (ref 3.5–5)
ALBUMIN/GLOB SERPL: 1.1 {RATIO} (ref 1.1–2.2)
ALP SERPL-CCNC: 55 U/L (ref 45–117)
ALT SERPL-CCNC: 22 U/L (ref 12–78)
ANION GAP SERPL CALC-SCNC: 6 MMOL/L (ref 5–15)
AST SERPL-CCNC: 11 U/L (ref 15–37)
BASOPHILS # BLD: 0.1 K/UL (ref 0–0.1)
BASOPHILS NFR BLD: 1 % (ref 0–1)
BILIRUB SERPL-MCNC: 0.4 MG/DL (ref 0.2–1)
BUN SERPL-MCNC: 13 MG/DL (ref 6–20)
BUN/CREAT SERPL: 14 (ref 12–20)
CALCIUM SERPL-MCNC: 9.1 MG/DL (ref 8.5–10.1)
CHLORIDE SERPL-SCNC: 107 MMOL/L (ref 97–108)
CO2 SERPL-SCNC: 25 MMOL/L (ref 21–32)
CREAT SERPL-MCNC: 0.92 MG/DL (ref 0.55–1.02)
DIFFERENTIAL METHOD BLD: NORMAL
EOSINOPHIL # BLD: 0.1 K/UL (ref 0–0.4)
EOSINOPHIL NFR BLD: 1 % (ref 0–7)
ERYTHROCYTE [DISTWIDTH] IN BLOOD BY AUTOMATED COUNT: 14.5 % (ref 11.5–14.5)
GLOBULIN SER CALC-MCNC: 3.4 G/DL (ref 2–4)
GLUCOSE SERPL-MCNC: 90 MG/DL (ref 65–100)
HCT VFR BLD AUTO: 38.3 % (ref 35–47)
HGB BLD-MCNC: 12.1 G/DL (ref 11.5–16)
IMM GRANULOCYTES # BLD AUTO: 0 K/UL (ref 0–0.04)
IMM GRANULOCYTES NFR BLD AUTO: 0 % (ref 0–0.5)
LYMPHOCYTES # BLD: 2.2 K/UL (ref 0.8–3.5)
LYMPHOCYTES NFR BLD: 31 % (ref 12–49)
MCH RBC QN AUTO: 28.7 PG (ref 26–34)
MCHC RBC AUTO-ENTMCNC: 31.6 G/DL (ref 30–36.5)
MCV RBC AUTO: 90.8 FL (ref 80–99)
MONOCYTES # BLD: 0.4 K/UL (ref 0–1)
MONOCYTES NFR BLD: 5 % (ref 5–13)
NEUTS SEG # BLD: 4.3 K/UL (ref 1.8–8)
NEUTS SEG NFR BLD: 62 % (ref 32–75)
NRBC # BLD: 0 K/UL (ref 0–0.01)
NRBC BLD-RTO: 0 PER 100 WBC
PLATELET # BLD AUTO: 274 K/UL (ref 150–400)
PMV BLD AUTO: 11.2 FL (ref 8.9–12.9)
POTASSIUM SERPL-SCNC: 4.6 MMOL/L (ref 3.5–5.1)
PROT SERPL-MCNC: 7.1 G/DL (ref 6.4–8.2)
RBC # BLD AUTO: 4.22 M/UL (ref 3.8–5.2)
SODIUM SERPL-SCNC: 138 MMOL/L (ref 136–145)
T4 FREE SERPL-MCNC: 0.9 NG/DL (ref 0.8–1.5)
TSH SERPL DL<=0.05 MIU/L-ACNC: 5.67 UIU/ML (ref 0.36–3.74)
WBC # BLD AUTO: 7 K/UL (ref 3.6–11)

## 2022-03-14 PROCEDURE — 99214 OFFICE O/P EST MOD 30 MIN: CPT | Performed by: PEDIATRICS

## 2022-03-14 NOTE — PROGRESS NOTES
Cc: 1. Primary hyperthyroidism          2. Increased heart rate- resolved         3. Irregular menstrual cycle          4. Weight gain and insulin resistance     HOPC:   1. Patient is 21 years and 6 months old with primary hyperthyroidism and is on methimazole 15 mg once a day. Compliance with medication is good. Patient takes the medication in the afternoon. Patient has good energy, has normal bowel movements. Side effects of medications including skin rash, joint pain, abdominal pain not present. 2. Goiter: none  3. Irregular menstrual cycle: on OCP and menstrual cycle are regular on OCP. Increased heart rate, and she is feeling less anxious. ROS: no bone pain, muscle cramps,no abdominal pain, normal bowel movements Good energy, no weakness. Family history: diabetes: no, thyroid dysfunction: no. Social history: doing well at school. Visit Vitals  /85 (BP 1 Location: Right arm, BP Patient Position: Sitting)   Pulse 85   Temp 98.2 °F (36.8 °C) (Oral)   Ht 5' 3.03\" (1.601 m)   Wt 172 lb 6.4 oz (78.2 kg)   LMP 02/28/2022   BMI 30.51 kg/m²     Neck is supple, no lymphadenopathy, no thyromegaly, has dark circles around the neck, pulse equal and normal rhythm, Abdomen is nondistended, DTR: normal     Labs from last visit reviewed:   Lab Results   Component Value Date/Time    TSH 4.99 (H) 11/19/2021 11:03 AM     A/P:   1. Primary hyperthyroidism will check Total T3, free T4 and TSH   Continue methimazole at 15 mg, once a day. Importance of taking medication was reviewed and association of taking medication and keeping the thyroid levels in euthyroid status and effect on metabolism was discussed. 2. obesity  3. Insulin resistance and reviewed and done well with weight management, liver dysfunction and rare side effects neutropenia reviewed. Patient need to get CBC with diff if they persistent fever of more than 101 *f for 2-3 days to rule out neutropenia.   Reviewed alternative treatment for hyperthyroidism including REZA. Patient expressed understanding. Methimazole side effects:    Reviewed stopping the Methimazole immediately and informing us by calling [phone] if the child develops   1. pruritic rash,   2. jaundice,   3. acolic (light color stools) stools or dark urine,   4. joint pain, abdominal pain   5. Fever or pharyngitis (sore throat). Follow up in 4 months.

## 2022-03-14 NOTE — PATIENT INSTRUCTIONS
Methimazole side effects:    Reviewed stopping the Methimazole immediately and informing us by calling [phone] if the child develops   1. pruritic rash,   2. jaundice,   3. acolic (light color stools) stools or dark urine,   4. joint pain, abdominal pain   5. Fever or pharyngitis (sore throat). Hyperthyroidism:  A Guide for Families  What is Hyperthyroidism? Hyperthyroidism refers to too much thyroid hormone in the blood coming from the thyroid gland. The signs and symptoms are listed below. It can occur at any age but more often above age 8 and more often in girls than in boys. Children and adolescents may have some, but not all the typical signs and symptoms of hyperthyroidism. What Are the Possible Signs and Symptoms of Hyperthyroidism? Enlargement of the thyroid gland (goiter); usually painless  Weight loss, despite a typical or even an increased appetite  Excessive sweating  Feeling too warm when others are comfortable  Rapid heart rate or heart palpitations  Poor school performance  Mood swings  Difficulty sleeping  Bulging or prominence of the eyes  Tremors of the hands  Hyperactivity or restlessness  Increased frequency of bowel movements or diarrhea  What Causes Hyperthyroidism? In children, the most common cause of hyperthyroidism is autoimmune hyperthyroidism (also known as Graves disease). The bodys immune system makes antibody proteins that stimulate the thyroid gland to make too much thyroid hormone. Less common causes include    Chronic lymphocytic thyroiditis (also known as Hashimoto disease). Ones own body generates an immune reaction to the thyroid gland that causes inflammation and release of preformed thyroid hormone. Subacute thyroiditis. A viral infection causes thyroid gland inflammation and release of preformed thyroid hormone. Unlike other causes of hyperthyroidism, subacute thyroiditis results in a painful thyroid gland. Certain thyroid nodules.  These are growths on the thyroid gland that can occasionally produce too much thyroid hormone. How is Hyperthyroidism Diagnosed? A detailed history and thorough physical examination may suggest hyperthyroidism. The diagnosis of hyperthyroidism is confirmed by blood tests that show elevated thyroid hormone levels (total or free levothyroxine [T4] and triiodothyronine [T3]) and very low thyroid-stimulating hormone (TSH) levels. Sometimes, additional tests are done to help the physician determine the structure (thyroid scan) and function (radioiodine uptake) of the thyroid gland. How is Hyperthyroidism Treated? There are 3 main ways to treat hyperthyroidism: antithyroid medications, radioactive iodine ablation, and surgery. Sometimes, medications called beta (?)-blockers are used initially to help relieve the symptoms of hyperthyroidism, but they do not reduce thyroid hormone levels. Optimal treatment will depend on the underlying cause of hyperthyroidism. Antithyroid medications. Methimazole is the first-line medical therapy in children. It is generally well tolerated. Potential side effects include hives, and rarely joint aches, high liver enzymes, and low white blood cell counts. (Propylthiouracil, a drug related to methimazole, is used less often in children because of a higher risk of serious liver side effects.)  Approximately 1 out of every 3 children or adolescents who take methimazole for Graves disease will be able to stop after 2 years. Some may never need to restart treatment; others may experience hyperthyroidism again. Radioactive iodine ablation. Radioactive iodine is swallowed as a capsule or drink. It painlessly destroys the thyroid gland slowly over several months, so that the thyroid gland no longer makes thyroid hormone. The individual eventually has hypothyroidism (too little thyroid hormone) and must take a pill containing thyroid hormone every day.   This treatment is very well tolerated and safe in children. It should not be given to women of childbearing age without first ensuring that they are not pregnant. Surgery. Surgical removal of the thyroid gland causes a rapid decrease in thyroid hormone levels. Subsequently, the individual has hypothyroidism and must replace thyroid hormone by taking a pill each day. Thyroid surgery is more risky than radioiodine and should be performed by an experienced surgeon. Possible risks include damage to the nearby parathyroid glands (which control blood calcium levels) and recurrent laryngeal nerve (which controls the voice). ?-Blockers. In the early stage of treatment, ?-blocker medicines, like propranolol or atenolol, are sometimes used to increase the comfort level of the young person with hyperthyroidism by decreasing the severity of symptoms caused by hyperthyroidism. Although these drugs will not affect the blood levels of thyroid hormones, they can help the patient feel better by decreasing symptoms such as palpitations, rapid heart rate, tremors, and anxiety. Ask the pediatric endocrine doctors to explain these types of treatments. The doctors will help you to select the most appropriate treatment for your child. Pediatric Endocrine Society/American Academy of Pediatrics Section on Endocrinology Patient Education Committee    American Academy of Pediatrics Logo         Copyright © 2018 American Academy of Pediatrics and Pediatric Endocrine Society. All rights reserved. The information contained in this publication should not be used as a substitute for the medical care and advice of your pediatrician. There may be variations in treatment that your pediatrician may recommend based on individual facts and circumstances.

## 2022-03-15 LAB — T3 SERPL-MCNC: 156 NG/DL (ref 71–180)

## 2022-04-25 RX ORDER — NORETHINDRONE ACETATE AND ETHINYL ESTRADIOL AND FERROUS FUMARATE 1MG-20(24)
KIT ORAL
Qty: 84 TABLET | Refills: 3 | Status: SHIPPED | OUTPATIENT
Start: 2022-04-25

## 2022-05-24 RX ORDER — METHIMAZOLE 10 MG/1
TABLET ORAL
Qty: 45 TABLET | Refills: 4 | Status: SHIPPED | OUTPATIENT
Start: 2022-05-24

## 2022-07-29 ENCOUNTER — OFFICE VISIT (OUTPATIENT)
Dept: PEDIATRIC ENDOCRINOLOGY | Age: 22
End: 2022-07-29
Payer: COMMERCIAL

## 2022-07-29 VITALS
SYSTOLIC BLOOD PRESSURE: 114 MMHG | WEIGHT: 169.8 LBS | RESPIRATION RATE: 16 BRPM | OXYGEN SATURATION: 98 % | DIASTOLIC BLOOD PRESSURE: 78 MMHG | TEMPERATURE: 98 F | HEIGHT: 63 IN | BODY MASS INDEX: 30.09 KG/M2 | HEART RATE: 87 BPM

## 2022-07-29 DIAGNOSIS — E05.90 HYPERTHYROIDISM: Primary | ICD-10-CM

## 2022-07-29 DIAGNOSIS — E05.90 HYPERTHYROIDISM: ICD-10-CM

## 2022-07-29 LAB
T4 FREE SERPL-MCNC: 1.1 NG/DL (ref 0.8–1.5)
TSH SERPL DL<=0.05 MIU/L-ACNC: 1.01 UIU/ML (ref 0.36–3.74)

## 2022-07-29 PROCEDURE — 99214 OFFICE O/P EST MOD 30 MIN: CPT | Performed by: PEDIATRICS

## 2022-07-29 NOTE — PROGRESS NOTES
Cc: 1. Primary hyperthyroidism          2. Increased heart rate- resolved         3. Irregular menstrual cycle          4. Weight gain and insulin resistance     HOPC:   1. Patient is 25years old with primary hyperthyroidism and is on methimazole 15 mg once a day. Compliance with medication is good. Patient takes the medication in the morning. Patient has good energy, has normal bowel movements. Side effects of medications including skin rash, joint pain, abdominal pain not present. 2. Goiter: none  3. Irregular menstrual cycle: on OCP and menstrual cycle are regular on OCP. Increased heart rate, and she is feeling less anxious. ROS: no bone pain, muscle cramps,no abdominal pain, normal bowel movements Good energy, no weakness. Family history: diabetes: no, thyroid dysfunction: no. Social history: doing well at school. 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    Visit Vitals  /78   Pulse 87   Temp 98 °F (36.7 °C) (Oral)   Resp 16   Ht 5' 2.87\" (1.597 m)   Wt 169 lb 12.8 oz (77 kg)   LMP 07/23/2022   SpO2 98%   BMI 30.20 kg/m²     Neck is supple, no lymphadenopathy, no thyromegaly, has dark circles around the neck, pulse equal and normal rhythm, Abdomen is nondistended, DTR: normal     Labs from last visit reviewed:   Lab Results   Component Value Date/Time    TSH 5.67 (H) 03/14/2022 10:04 AM     A/P:   1. Primary hyperthyroidism will check Total T3, free T4 and TSH   Continue methimazole at 15 mg, once a day. Importance of taking medication was reviewed and association of taking medication and keeping the thyroid levels in euthyroid status and effect on metabolism was discussed. 2. Obesity- done well with weight management  3. Insulin resistance and reviewed and done well with weight management, liver dysfunction and rare side effects neutropenia reviewed. Patient need to get CBC with diff if they persistent fever of more than 101 *f for 2-3 days to rule out neutropenia.   Reviewed alternative treatment for hyperthyroidism including COBB. Patient expressed understanding. Details of COBB provided. She has appointment with adult endocrinologist in Oct 2022. Methimazole side effects:    Reviewed stopping the Methimazole immediately and informing us by calling [phone] if the child develops   pruritic rash,   jaundice,   acolic (light color stools) stools or dark urine,   joint pain, abdominal pain   Fever or pharyngitis (sore throat). Follow up with adult endocrinologist as scheduled in Oct 2022. Patricklew Coppola

## 2022-07-29 NOTE — PATIENT INSTRUCTIONS
Methimazole side effects:    Reviewed stopping the Methimazole immediately and informing us by calling [phone] if the child develops   pruritic rash,   jaundice,   acolic (light color stools) stools or dark urine,   joint pain, abdominal pain   Fever or pharyngitis (sore throat).

## 2022-07-30 LAB — T3 SERPL-MCNC: 140 NG/DL (ref 71–180)

## 2023-05-20 RX ORDER — METHIMAZOLE 10 MG/1
1.5 TABLET ORAL DAILY
COMMUNITY
Start: 2022-05-24

## 2023-05-20 RX ORDER — NORETHINDRONE ACETATE AND ETHINYL ESTRADIOL AND FERROUS FUMARATE 1MG-20(24)
1 KIT ORAL DAILY
COMMUNITY
Start: 2022-04-25

## 2025-06-18 ENCOUNTER — APPOINTMENT (OUTPATIENT)
Facility: HOSPITAL | Age: 25
End: 2025-06-18
Payer: COMMERCIAL

## 2025-06-18 ENCOUNTER — NURSE TRIAGE (OUTPATIENT)
Dept: OTHER | Facility: CLINIC | Age: 25
End: 2025-06-18

## 2025-06-18 ENCOUNTER — HOSPITAL ENCOUNTER (EMERGENCY)
Facility: HOSPITAL | Age: 25
Discharge: HOME | End: 2025-06-18
Attending: EMERGENCY MEDICINE
Payer: COMMERCIAL

## 2025-06-18 VITALS
DIASTOLIC BLOOD PRESSURE: 74 MMHG | HEIGHT: 62 IN | BODY MASS INDEX: 35.54 KG/M2 | HEART RATE: 87 BPM | RESPIRATION RATE: 18 BRPM | WEIGHT: 193.12 LBS | OXYGEN SATURATION: 100 % | TEMPERATURE: 97.7 F | SYSTOLIC BLOOD PRESSURE: 130 MMHG
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
## 2025-06-18 DIAGNOSIS — R07.9 CHEST PAIN, UNSPECIFIED TYPE: Primary | ICD-10-CM

## 2025-06-18 PROCEDURE — 99284 EMERGENCY DEPT VISIT MOD MDM: CPT

## 2025-06-18 PROCEDURE — 6370000000 HC RX 637 (ALT 250 FOR IP)

## 2025-06-18 PROCEDURE — 71046 X-RAY EXAM CHEST 2 VIEWS: CPT

## 2025-06-18 PROCEDURE — 93005 ELECTROCARDIOGRAM TRACING: CPT | Performed by: EMERGENCY MEDICINE

## 2025-06-18 RX ORDER — ACETAMINOPHEN 500 MG
1000 TABLET ORAL ONCE
Status: COMPLETED | OUTPATIENT
Start: 2025-06-18 | End: 2025-06-18

## 2025-06-18 RX ORDER — IBUPROFEN 400 MG/1
400 TABLET, FILM COATED ORAL
Status: COMPLETED | OUTPATIENT
Start: 2025-06-18 | End: 2025-06-18

## 2025-06-18 RX ADMIN — IBUPROFEN 400 MG: 400 TABLET, FILM COATED ORAL at 22:50

## 2025-06-18 RX ADMIN — ACETAMINOPHEN 1000 MG: 500 TABLET ORAL at 22:50

## 2025-06-18 ASSESSMENT — PAIN DESCRIPTION - ORIENTATION
ORIENTATION: MID
ORIENTATION: MID

## 2025-06-18 ASSESSMENT — PAIN DESCRIPTION - DESCRIPTORS
DESCRIPTORS: ACHING
DESCRIPTORS: ACHING;DISCOMFORT

## 2025-06-18 ASSESSMENT — PAIN DESCRIPTION - LOCATION
LOCATION: CHEST
LOCATION: CHEST

## 2025-06-18 ASSESSMENT — PAIN - FUNCTIONAL ASSESSMENT
PAIN_FUNCTIONAL_ASSESSMENT: 0-10
PAIN_FUNCTIONAL_ASSESSMENT: ACTIVITIES ARE NOT PREVENTED
PAIN_FUNCTIONAL_ASSESSMENT: ACTIVITIES ARE NOT PREVENTED

## 2025-06-18 ASSESSMENT — PAIN SCALES - GENERAL
PAINLEVEL_OUTOF10: 6
PAINLEVEL_OUTOF10: 7

## 2025-06-18 ASSESSMENT — PAIN DESCRIPTION - FREQUENCY: FREQUENCY: INTERMITTENT

## 2025-06-18 ASSESSMENT — LIFESTYLE VARIABLES
HOW MANY STANDARD DRINKS CONTAINING ALCOHOL DO YOU HAVE ON A TYPICAL DAY: PATIENT DOES NOT DRINK
HOW OFTEN DO YOU HAVE A DRINK CONTAINING ALCOHOL: NEVER

## 2025-06-18 ASSESSMENT — PAIN DESCRIPTION - PAIN TYPE: TYPE: ACUTE PAIN
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
## 2025-06-19 LAB
EKG ATRIAL RATE: 99 BPM
EKG DIAGNOSIS: NORMAL
EKG P AXIS: 51 DEGREES
EKG P-R INTERVAL: 110 MS
EKG Q-T INTERVAL: 342 MS
EKG QRS DURATION: 88 MS
EKG QTC CALCULATION (BAZETT): 438 MS
EKG R AXIS: 92 DEGREES
EKG T AXIS: 15 DEGREES
EKG VENTRICULAR RATE: 99 BPM
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
## 2025-06-19 NOTE — DISCHARGE INSTRUCTIONS
Thank you for allowing us to provide you with medical care today.  We realize that you have many choices for your emergency care needs.  We thank you for choosing [facility].  Please choose us in the future for any continued health care needs.     We hope we addressed all of your medical concerns. We strive to provide excellent quality care in the Emergency Department.  Anything less than excellent does not meet our expectations.     The exam and treatment you received in the Emergency Department were for an emergent problem and are not intended as complete care. It is important that you follow up with a doctor, nurse practitioner, or physician’s assistant for ongoing care. If your symptoms worsen or you do not improve as expected and you are unable to reach your usual health care provider, you should return to the Emergency Department. We are available 24 hours a day.     Take this sheet with you when you go to your follow-up visit.     If you have any problem arranging the follow-up visit, contact the Emergency Department immediately.     Make an appointment your family doctor for follow up of this visit. Return to the ER if you are unable to be seen in a timely manner.     Below is a summary of your results.    Labs  Recent Results (from the past 12 hours)   EKG 12 Lead    Collection Time: 06/18/25  8:47 PM   Result Value Ref Range    Ventricular Rate 99 BPM    Atrial Rate 99 BPM    P-R Interval 110 ms    QRS Duration 88 ms    Q-T Interval 342 ms    QTc Calculation (Bazett) 438 ms    P Axis 51 degrees    R Axis 92 degrees    T Axis 15 degrees    Diagnosis       Sinus rhythm with short OK  Rightward axis  Nonspecific T wave abnormality  Abnormal ECG  No previous ECGs available         Radiologic Studies  XR CHEST (2 VW)   Final Result   No acute intrathoracic process.         Electronically signed by QUINN GRIFFIN